# Patient Record
Sex: MALE | Race: WHITE | URBAN - METROPOLITAN AREA
[De-identification: names, ages, dates, MRNs, and addresses within clinical notes are randomized per-mention and may not be internally consistent; named-entity substitution may affect disease eponyms.]

---

## 2017-10-10 ENCOUNTER — IMPORTED ENCOUNTER (OUTPATIENT)
Dept: URBAN - METROPOLITAN AREA CLINIC 50 | Facility: CLINIC | Age: 67
End: 2017-10-10

## 2017-10-16 ENCOUNTER — IMPORTED ENCOUNTER (OUTPATIENT)
Dept: URBAN - METROPOLITAN AREA CLINIC 50 | Facility: CLINIC | Age: 67
End: 2017-10-16

## 2017-10-25 ENCOUNTER — IMPORTED ENCOUNTER (OUTPATIENT)
Dept: URBAN - METROPOLITAN AREA CLINIC 50 | Facility: CLINIC | Age: 67
End: 2017-10-25

## 2017-12-19 ENCOUNTER — IMPORTED ENCOUNTER (OUTPATIENT)
Dept: URBAN - METROPOLITAN AREA CLINIC 50 | Facility: CLINIC | Age: 67
End: 2017-12-19

## 2018-01-02 ENCOUNTER — IMPORTED ENCOUNTER (OUTPATIENT)
Dept: URBAN - METROPOLITAN AREA CLINIC 50 | Facility: CLINIC | Age: 68
End: 2018-01-02

## 2018-01-17 ENCOUNTER — IMPORTED ENCOUNTER (OUTPATIENT)
Dept: URBAN - METROPOLITAN AREA CLINIC 50 | Facility: CLINIC | Age: 68
End: 2018-01-17

## 2018-03-14 ENCOUNTER — IMPORTED ENCOUNTER (OUTPATIENT)
Dept: URBAN - METROPOLITAN AREA CLINIC 50 | Facility: CLINIC | Age: 68
End: 2018-03-14

## 2018-05-01 ENCOUNTER — IMPORTED ENCOUNTER (OUTPATIENT)
Dept: URBAN - METROPOLITAN AREA CLINIC 50 | Facility: CLINIC | Age: 68
End: 2018-05-01

## 2018-05-07 ENCOUNTER — IMPORTED ENCOUNTER (OUTPATIENT)
Dept: URBAN - METROPOLITAN AREA CLINIC 50 | Facility: CLINIC | Age: 68
End: 2018-05-07

## 2018-07-09 ENCOUNTER — IMPORTED ENCOUNTER (OUTPATIENT)
Dept: URBAN - METROPOLITAN AREA CLINIC 50 | Facility: CLINIC | Age: 68
End: 2018-07-09

## 2018-08-07 ENCOUNTER — IMPORTED ENCOUNTER (OUTPATIENT)
Dept: URBAN - METROPOLITAN AREA CLINIC 50 | Facility: CLINIC | Age: 68
End: 2018-08-07

## 2018-08-23 ENCOUNTER — IMPORTED ENCOUNTER (OUTPATIENT)
Dept: URBAN - METROPOLITAN AREA CLINIC 50 | Facility: CLINIC | Age: 68
End: 2018-08-23

## 2018-10-15 ENCOUNTER — IMPORTED ENCOUNTER (OUTPATIENT)
Dept: URBAN - METROPOLITAN AREA CLINIC 50 | Facility: CLINIC | Age: 68
End: 2018-10-15

## 2018-10-30 ENCOUNTER — IMPORTED ENCOUNTER (OUTPATIENT)
Dept: URBAN - METROPOLITAN AREA CLINIC 50 | Facility: CLINIC | Age: 68
End: 2018-10-30

## 2018-11-12 ENCOUNTER — IMPORTED ENCOUNTER (OUTPATIENT)
Dept: URBAN - METROPOLITAN AREA CLINIC 50 | Facility: CLINIC | Age: 68
End: 2018-11-12

## 2019-01-24 ENCOUNTER — IMPORTED ENCOUNTER (OUTPATIENT)
Dept: URBAN - METROPOLITAN AREA CLINIC 50 | Facility: CLINIC | Age: 69
End: 2019-01-24

## 2019-02-04 ENCOUNTER — IMPORTED ENCOUNTER (OUTPATIENT)
Dept: URBAN - METROPOLITAN AREA CLINIC 50 | Facility: CLINIC | Age: 69
End: 2019-02-04

## 2019-03-11 ENCOUNTER — IMPORTED ENCOUNTER (OUTPATIENT)
Dept: URBAN - METROPOLITAN AREA CLINIC 50 | Facility: CLINIC | Age: 69
End: 2019-03-11

## 2019-04-16 ENCOUNTER — IMPORTED ENCOUNTER (OUTPATIENT)
Dept: URBAN - METROPOLITAN AREA CLINIC 50 | Facility: CLINIC | Age: 69
End: 2019-04-16

## 2019-04-23 ENCOUNTER — IMPORTED ENCOUNTER (OUTPATIENT)
Dept: URBAN - METROPOLITAN AREA CLINIC 50 | Facility: CLINIC | Age: 69
End: 2019-04-23

## 2019-04-23 NOTE — PATIENT DISCUSSION
"""SLT right eye performed today with signed consent."" ""Start Prednisolone Acetate right eye twice a day

## 2019-07-01 ENCOUNTER — IMPORTED ENCOUNTER (OUTPATIENT)
Dept: URBAN - METROPOLITAN AREA CLINIC 50 | Facility: CLINIC | Age: 69
End: 2019-07-01

## 2019-07-01 NOTE — PATIENT DISCUSSION
"""S/P SLT OU "" ""Reassured patient that intraocular pressures (IOPs) are at target levels and other ocular findings are stable. Continue present management and/or medication(s).  Follow up as directed. "" ""Continue Dorzolamide both eyes twice a day ."" ""Continue Timolol Maleate 0.5% both eyes twice a day ."" ""Continue Latanoprost both eyes at bedtime ."""

## 2019-08-14 ENCOUNTER — IMPORTED ENCOUNTER (OUTPATIENT)
Dept: URBAN - METROPOLITAN AREA CLINIC 50 | Facility: CLINIC | Age: 69
End: 2019-08-14

## 2019-10-15 ENCOUNTER — IMPORTED ENCOUNTER (OUTPATIENT)
Dept: URBAN - METROPOLITAN AREA CLINIC 50 | Facility: CLINIC | Age: 69
End: 2019-10-15

## 2019-11-11 ENCOUNTER — IMPORTED ENCOUNTER (OUTPATIENT)
Dept: URBAN - METROPOLITAN AREA CLINIC 50 | Facility: CLINIC | Age: 69
End: 2019-11-11

## 2019-12-02 ENCOUNTER — IMPORTED ENCOUNTER (OUTPATIENT)
Dept: URBAN - METROPOLITAN AREA CLINIC 50 | Facility: CLINIC | Age: 69
End: 2019-12-02

## 2019-12-03 ENCOUNTER — IMPORTED ENCOUNTER (OUTPATIENT)
Dept: URBAN - METROPOLITAN AREA CLINIC 50 | Facility: CLINIC | Age: 69
End: 2019-12-03

## 2019-12-04 ENCOUNTER — IMPORTED ENCOUNTER (OUTPATIENT)
Dept: URBAN - METROPOLITAN AREA CLINIC 50 | Facility: CLINIC | Age: 69
End: 2019-12-04

## 2019-12-16 ENCOUNTER — IMPORTED ENCOUNTER (OUTPATIENT)
Dept: URBAN - METROPOLITAN AREA CLINIC 50 | Facility: CLINIC | Age: 69
End: 2019-12-16

## 2020-01-31 ENCOUNTER — IMPORTED ENCOUNTER (OUTPATIENT)
Dept: URBAN - METROPOLITAN AREA CLINIC 50 | Facility: CLINIC | Age: 70
End: 2020-01-31

## 2020-02-05 ENCOUNTER — IMPORTED ENCOUNTER (OUTPATIENT)
Dept: URBAN - METROPOLITAN AREA CLINIC 50 | Facility: CLINIC | Age: 70
End: 2020-02-05

## 2020-02-14 ENCOUNTER — IMPORTED ENCOUNTER (OUTPATIENT)
Dept: URBAN - METROPOLITAN AREA CLINIC 50 | Facility: CLINIC | Age: 70
End: 2020-02-14

## 2020-02-17 ENCOUNTER — IMPORTED ENCOUNTER (OUTPATIENT)
Dept: URBAN - METROPOLITAN AREA CLINIC 50 | Facility: CLINIC | Age: 70
End: 2020-02-17

## 2020-03-09 ENCOUNTER — IMPORTED ENCOUNTER (OUTPATIENT)
Dept: URBAN - METROPOLITAN AREA CLINIC 50 | Facility: CLINIC | Age: 70
End: 2020-03-09

## 2020-03-10 ENCOUNTER — IMPORTED ENCOUNTER (OUTPATIENT)
Dept: URBAN - METROPOLITAN AREA CLINIC 50 | Facility: CLINIC | Age: 70
End: 2020-03-10

## 2020-04-20 ENCOUNTER — IMPORTED ENCOUNTER (OUTPATIENT)
Dept: URBAN - METROPOLITAN AREA CLINIC 50 | Facility: CLINIC | Age: 70
End: 2020-04-20

## 2020-07-27 ENCOUNTER — IMPORTED ENCOUNTER (OUTPATIENT)
Dept: URBAN - METROPOLITAN AREA CLINIC 50 | Facility: CLINIC | Age: 70
End: 2020-07-27

## 2020-10-30 ENCOUNTER — IMPORTED ENCOUNTER (OUTPATIENT)
Dept: URBAN - METROPOLITAN AREA CLINIC 50 | Facility: CLINIC | Age: 70
End: 2020-10-30

## 2020-12-23 ENCOUNTER — IMPORTED ENCOUNTER (OUTPATIENT)
Dept: URBAN - METROPOLITAN AREA CLINIC 50 | Facility: CLINIC | Age: 70
End: 2020-12-23

## 2020-12-30 ENCOUNTER — IMPORTED ENCOUNTER (OUTPATIENT)
Dept: URBAN - METROPOLITAN AREA CLINIC 50 | Facility: CLINIC | Age: 70
End: 2020-12-30

## 2021-01-11 ENCOUNTER — IMPORTED ENCOUNTER (OUTPATIENT)
Dept: URBAN - METROPOLITAN AREA CLINIC 50 | Facility: CLINIC | Age: 71
End: 2021-01-11

## 2021-01-25 ENCOUNTER — IMPORTED ENCOUNTER (OUTPATIENT)
Dept: URBAN - METROPOLITAN AREA CLINIC 50 | Facility: CLINIC | Age: 71
End: 2021-01-25

## 2021-03-22 ENCOUNTER — IMPORTED ENCOUNTER (OUTPATIENT)
Dept: URBAN - METROPOLITAN AREA CLINIC 50 | Facility: CLINIC | Age: 71
End: 2021-03-22

## 2021-03-29 ENCOUNTER — IMPORTED ENCOUNTER (OUTPATIENT)
Dept: URBAN - METROPOLITAN AREA CLINIC 50 | Facility: CLINIC | Age: 71
End: 2021-03-29

## 2021-04-17 ASSESSMENT — PACHYMETRY
OS_CT_UM: 515
OD_CT_UM: 494
OS_CT_UM: 515
OD_CT_UM: 494
OD_CT_UM: 494
OS_CT_UM: 515
OD_CT_UM: 494
OS_CT_UM: 515
OS_CT_UM: 515
OD_CT_UM: 494
OS_CT_UM: 515
OD_CT_UM: 494
OD_CT_UM: 494
OS_CT_UM: 515
OS_CT_UM: 515
OD_CT_UM: 494
OS_CT_UM: 515
OD_CT_UM: 494
OD_CT_UM: 494
OS_CT_UM: 515
OS_CT_UM: 515
OD_CT_UM: 494
OS_CT_UM: 515
OD_CT_UM: 494
OD_CT_UM: 494
OS_CT_UM: 515
OD_CT_UM: 494
OS_CT_UM: 515
OD_CT_UM: 494
OD_CT_UM: 494
OS_CT_UM: 515
OS_CT_UM: 515
OD_CT_UM: 494
OS_CT_UM: 515
OD_CT_UM: 494
OS_CT_UM: 515
OD_CT_UM: 494
OS_CT_UM: 515
OD_CT_UM: 494
OS_CT_UM: 515
OD_CT_UM: 494
OS_CT_UM: 515
OD_CT_UM: 494
OD_CT_UM: 494
OS_CT_UM: 515

## 2021-04-17 ASSESSMENT — VISUAL ACUITY
OS_SC: 20/40
OS_SC: 20/30
OS_PH: 20/40
OS_CC: 20/50
OS_SC: 20/40
OS_SC: 20/30
OS_CC: 20/50
OD_SC: CF@2FT
OS_CC: 20/40
OS_PH: @ 14 IN
OS_SC: 20/30
OS_SC: 20/30+1
OS_CC: 20/40
OS_BAT: 20/50
OS_SC: 20/30-
OD_SC: CF@2FT
OS_CC: 20/40
OS_SC: 20/40
OS_SC: 20/30
OD_SC: CF @ 3 FT
OS_PH: 20/50
OD_CC: 20/400 ECC
OS_CC: 20/30-2
OS_CC: 20/25
OS_PH: 20/25+
OS_CC: 20/40-1
OS_SC: 20/30-1
OS_CC: 20/40
OS_CC: 20/40
OS_SC: 20/40-1
OS_OTHER: 20/50. 20/70.
OS_SC: 20/60+1

## 2021-04-17 ASSESSMENT — TONOMETRY
OS_IOP_MMHG: 14
OD_IOP_MMHG: 12
OD_IOP_MMHG: 16
OD_IOP_MMHG: 15
OD_IOP_MMHG: 17
OS_IOP_MMHG: 15
OS_IOP_MMHG: 12
OS_IOP_MMHG: 10
OS_IOP_MMHG: 12
OD_IOP_MMHG: 14
OS_IOP_MMHG: 12
OD_IOP_MMHG: 13
OS_IOP_MMHG: 12
OS_IOP_MMHG: 17
OD_IOP_MMHG: 12
OD_IOP_MMHG: 15
OS_IOP_MMHG: 12
OD_IOP_MMHG: 11
OD_IOP_MMHG: 11
OS_IOP_MMHG: 13
OD_IOP_MMHG: 10
OS_IOP_MMHG: 8
OD_IOP_MMHG: 10
OS_IOP_MMHG: 10
OS_IOP_MMHG: 10
OD_IOP_MMHG: 10
OD_IOP_MMHG: 12
OD_IOP_MMHG: 15
OS_IOP_MMHG: 15
OS_IOP_MMHG: 13
OD_IOP_MMHG: 13
OS_IOP_MMHG: 15
OD_IOP_MMHG: 12
OS_IOP_MMHG: 13
OD_IOP_MMHG: 9
OD_IOP_MMHG: 14
OS_IOP_MMHG: 13
OS_IOP_MMHG: 14
OS_IOP_MMHG: 15
OS_IOP_MMHG: 14
OD_IOP_MMHG: 13
OD_IOP_MMHG: 18
OD_IOP_MMHG: 12
OS_IOP_MMHG: 10
OS_IOP_MMHG: 08
OD_IOP_MMHG: 13
OS_IOP_MMHG: 12
OS_IOP_MMHG: 13
OD_IOP_MMHG: 11
OS_IOP_MMHG: 11
OD_IOP_MMHG: 12
OS_IOP_MMHG: 10
OD_IOP_MMHG: 14
OS_IOP_MMHG: 13
OD_IOP_MMHG: 14
OS_IOP_MMHG: 10
OD_IOP_MMHG: 16
OS_IOP_MMHG: 12
OD_IOP_MMHG: 11
OD_IOP_MMHG: 11
OD_IOP_MMHG: 09
OS_IOP_MMHG: 08
OD_IOP_MMHG: 15
OS_IOP_MMHG: 11
OS_IOP_MMHG: 10
OS_IOP_MMHG: 15
OS_IOP_MMHG: 13
OD_IOP_MMHG: 14
OD_IOP_MMHG: 16
OD_IOP_MMHG: 12

## 2021-06-02 NOTE — PATIENT DISCUSSION
"""SLT left eye performed today with patient's signed consent. ""
"""SLT right eye scheduled 4/23/19. ""
Satisfactory

## 2021-06-19 ENCOUNTER — PREPPED CHART (OUTPATIENT)
Dept: URBAN - METROPOLITAN AREA CLINIC 50 | Facility: CLINIC | Age: 71
End: 2021-06-19

## 2021-06-21 ENCOUNTER — 3 MONTH FOLLOW-UP (OUTPATIENT)
Dept: URBAN - METROPOLITAN AREA CLINIC 50 | Facility: CLINIC | Age: 71
End: 2021-06-21

## 2021-06-21 DIAGNOSIS — H40.1112: ICD-10-CM

## 2021-06-21 DIAGNOSIS — H40.1123: ICD-10-CM

## 2021-06-21 PROCEDURE — 92012 INTRM OPH EXAM EST PATIENT: CPT

## 2021-06-21 ASSESSMENT — VISUAL ACUITY
OD_SC: CF 3FT
OS_SC: 20/30

## 2021-06-21 ASSESSMENT — TONOMETRY
OD_IOP_MMHG: 12
OS_IOP_MMHG: 11

## 2021-06-21 NOTE — PATIENT DISCUSSION
Moderate Primary Open Angle Glaucoma, right eye (S/P SLT OD 3/22/2021). IOP stable with current drop therapy. Will continue to monitor.

## 2021-06-21 NOTE — PATIENT DISCUSSION
Severe Primary Open Angle Glaucoma, left eye (S/P SLT OS 4/2019, S/P SLT OS 3/29/2021). IOP stable with current drop therapy. Will continue to monitor.

## 2021-10-18 ENCOUNTER — 4 MONTH FOLLOW-UP (OUTPATIENT)
Dept: URBAN - METROPOLITAN AREA CLINIC 50 | Facility: CLINIC | Age: 71
End: 2021-10-18

## 2021-10-18 DIAGNOSIS — H40.1123: ICD-10-CM

## 2021-10-18 DIAGNOSIS — H40.1112: ICD-10-CM

## 2021-10-18 PROCEDURE — 92012 INTRM OPH EXAM EST PATIENT: CPT

## 2021-10-18 ASSESSMENT — TONOMETRY
OS_IOP_MMHG: 13
OS_IOP_MMHG: 11
OD_IOP_MMHG: 12
OD_IOP_MMHG: 15

## 2021-10-18 ASSESSMENT — VISUAL ACUITY
OS_SC: 20/30-2
OD_SC: CF 5FT
OU_SC: 20/30-2

## 2022-03-10 ENCOUNTER — DIAGNOSTICS ONLY (OUTPATIENT)
Dept: URBAN - METROPOLITAN AREA CLINIC 48 | Facility: CLINIC | Age: 72
End: 2022-03-10

## 2022-03-10 ENCOUNTER — EMERGENCY VISIT (OUTPATIENT)
Dept: URBAN - METROPOLITAN AREA CLINIC 48 | Facility: CLINIC | Age: 72
End: 2022-03-10

## 2022-03-10 DIAGNOSIS — H35.372: ICD-10-CM

## 2022-03-10 DIAGNOSIS — H40.1112: ICD-10-CM

## 2022-03-10 DIAGNOSIS — H40.1123: ICD-10-CM

## 2022-03-10 DIAGNOSIS — H27.111: ICD-10-CM

## 2022-03-10 PROCEDURE — 92134 CPTRZ OPH DX IMG PST SGM RTA: CPT

## 2022-03-10 PROCEDURE — 92014 COMPRE OPH EXAM EST PT 1/>: CPT

## 2022-03-10 PROCEDURE — 92083 EXTENDED VISUAL FIELD XM: CPT

## 2022-03-10 PROCEDURE — 92133 CPTRZD OPH DX IMG PST SGM ON: CPT

## 2022-03-10 ASSESSMENT — TONOMETRY
OS_IOP_MMHG: 11
OD_IOP_MMHG: 12

## 2022-03-10 ASSESSMENT — VISUAL ACUITY
OD_CC: CF 6FT
OU_CC: 20/40+2
OS_CC: 20/40+2

## 2022-04-04 ENCOUNTER — ESTABLISHED PATIENT (OUTPATIENT)
Dept: URBAN - METROPOLITAN AREA CLINIC 50 | Facility: CLINIC | Age: 72
End: 2022-04-04

## 2022-04-04 DIAGNOSIS — H40.1123: ICD-10-CM

## 2022-04-04 DIAGNOSIS — H40.1112: ICD-10-CM

## 2022-04-04 DIAGNOSIS — H33.003: ICD-10-CM

## 2022-04-04 DIAGNOSIS — H27.111: ICD-10-CM

## 2022-04-04 DIAGNOSIS — H35.372: ICD-10-CM

## 2022-04-04 PROCEDURE — 92134 CPTRZ OPH DX IMG PST SGM RTA: CPT

## 2022-04-04 PROCEDURE — 92014 COMPRE OPH EXAM EST PT 1/>: CPT

## 2022-04-04 ASSESSMENT — TONOMETRY
OD_IOP_MMHG: 14
OS_IOP_MMHG: 13
OS_IOP_MMHG: 11
OD_IOP_MMHG: 11

## 2022-04-04 ASSESSMENT — VISUAL ACUITY
OS_CC: 20/30-1
OD_CC: CF 3FT
OS_CC: J2

## 2022-04-04 NOTE — PATIENT DISCUSSION
D/w patient the options of suturing lens back in or removing. Patient would like to proceed with suturing lens back in. Patient follows up with Dr. Jessica Erickson on Monday. Advised patient to keep appointment with Dr. Jessica Erickson before proceeding with surgery.

## 2022-06-28 NOTE — PATIENT DISCUSSION
likely due to CLRx intolerance. Patient reports re-using CLRx for days but denies sleeping or showering in lenses. d/c CLRx until redness resolves, at least 1 week. Begin FML QID OU until sample runs out. Recommended different modality/material within daily lens category. IF daily lenses not an option, recommended ClearCare cleaning solution. Stressed importance of good hygiene with and without CLRx.

## 2022-12-05 ENCOUNTER — FOLLOW UP (OUTPATIENT)
Dept: URBAN - METROPOLITAN AREA CLINIC 50 | Facility: CLINIC | Age: 72
End: 2022-12-05

## 2022-12-05 PROCEDURE — 92012 INTRM OPH EXAM EST PATIENT: CPT

## 2022-12-05 ASSESSMENT — VISUAL ACUITY
OD_CC: CF 4FT
OS_CC: J2
OU_CC: 20/30
OS_CC: 20/30
OS_PH: 20/25-2

## 2022-12-05 ASSESSMENT — TONOMETRY
OS_IOP_MMHG: 13
OD_IOP_MMHG: 13
OD_IOP_MMHG: 10
OS_IOP_MMHG: 11

## 2022-12-05 NOTE — PATIENT DISCUSSION
IOP today 13 OU. Patient is to continue current drop therapy.  Will see patient back in 6 months for HVF 24-2 Fast/OCT RNFL/Comprehensive Exam.

## 2022-12-05 NOTE — PATIENT DISCUSSION
Dislocated IOL inferiorly. Patient saw Dr. Chanda Kearns at [facility] and he recommended to leave the IOL as it is since he already has such profound vision loss. Returned call to Rosa and informed her of below recommendations, verbalized understanding. Will fax over med administration form.    Writer called mother at new number and informed her of below recommendations as well, verbalized understanding.  Rx e-scribed for 2 mg tablets.  Also scheduled patient follow-up appointment on 4/10/17.  Forwarded to Dr. Meli Butt as MAYA

## 2023-08-21 ENCOUNTER — COMPREHENSIVE EXAM (OUTPATIENT)
Dept: URBAN - METROPOLITAN AREA CLINIC 50 | Facility: LOCATION | Age: 73
End: 2023-08-21

## 2023-08-21 DIAGNOSIS — H40.1112: ICD-10-CM

## 2023-08-21 DIAGNOSIS — H40.1123: ICD-10-CM

## 2023-08-21 DIAGNOSIS — H16.223: ICD-10-CM

## 2023-08-21 DIAGNOSIS — H35.372: ICD-10-CM

## 2023-08-21 PROCEDURE — 92015 DETERMINE REFRACTIVE STATE: CPT

## 2023-08-21 PROCEDURE — 92133 CPTRZD OPH DX IMG PST SGM ON: CPT

## 2023-08-21 PROCEDURE — 92083 EXTENDED VISUAL FIELD XM: CPT

## 2023-08-21 PROCEDURE — 92014 COMPRE OPH EXAM EST PT 1/>: CPT

## 2023-08-21 ASSESSMENT — TONOMETRY
OD_IOP_MMHG: 16
OS_IOP_MMHG: 13
OS_IOP_MMHG: 15
OD_IOP_MMHG: 13

## 2023-08-21 ASSESSMENT — VISUAL ACUITY
OS_SC: 20/70
OU_SC: 20/50
OD_PH: 20/40

## 2023-08-21 ASSESSMENT — KERATOMETRY
OD_K1POWER_DIOPTERS: 41.75
OS_AXISANGLE_DEGREES: 150
OD_AXISANGLE2_DEGREES: 90
OS_AXISANGLE2_DEGREES: 60
OS_K1POWER_DIOPTERS: 41.25
OD_AXISANGLE_DEGREES: 0
OS_K2POWER_DIOPTERS: 42.25
OD_K2POWER_DIOPTERS: 41.75

## 2023-10-30 ENCOUNTER — FOLLOW UP (OUTPATIENT)
Dept: URBAN - METROPOLITAN AREA CLINIC 50 | Facility: LOCATION | Age: 73
End: 2023-10-30

## 2023-10-30 DIAGNOSIS — H40.1123: ICD-10-CM

## 2023-10-30 DIAGNOSIS — H40.1112: ICD-10-CM

## 2023-10-30 PROCEDURE — 92012 INTRM OPH EXAM EST PATIENT: CPT

## 2023-10-30 ASSESSMENT — KERATOMETRY
OS_AXISANGLE_DEGREES: 150
OS_K1POWER_DIOPTERS: 41.25
OS_K2POWER_DIOPTERS: 42.25
OS_AXISANGLE2_DEGREES: 60
OD_AXISANGLE2_DEGREES: 90
OD_K2POWER_DIOPTERS: 41.75
OD_AXISANGLE_DEGREES: 0
OD_K1POWER_DIOPTERS: 41.75

## 2023-10-30 ASSESSMENT — TONOMETRY
OS_IOP_MMHG: 12
OD_IOP_MMHG: 13
OD_IOP_MMHG: 10
OS_IOP_MMHG: 10

## 2023-10-30 ASSESSMENT — VISUAL ACUITY
OS_SC: 20/50
OD_SC: CF 1FT
OD_PH: 20/40

## 2024-06-03 ENCOUNTER — FOLLOW UP (OUTPATIENT)
Dept: URBAN - METROPOLITAN AREA CLINIC 50 | Facility: LOCATION | Age: 74
End: 2024-06-03

## 2024-06-03 DIAGNOSIS — H40.1123: ICD-10-CM

## 2024-06-03 DIAGNOSIS — H27.01: ICD-10-CM

## 2024-06-03 DIAGNOSIS — H40.1112: ICD-10-CM

## 2024-06-03 DIAGNOSIS — H16.223: ICD-10-CM

## 2024-06-03 PROCEDURE — 99213 OFFICE O/P EST LOW 20 MIN: CPT

## 2024-06-03 ASSESSMENT — VISUAL ACUITY
OS_SC: 20/50
OD_SC: CF 1FT

## 2024-06-03 ASSESSMENT — KERATOMETRY
OD_K2POWER_DIOPTERS: 41.75
OD_K1POWER_DIOPTERS: 41.75
OD_AXISANGLE_DEGREES: 0
OS_K1POWER_DIOPTERS: 41.25
OS_AXISANGLE_DEGREES: 150
OS_K2POWER_DIOPTERS: 42.25
OS_AXISANGLE2_DEGREES: 60
OD_AXISANGLE2_DEGREES: 90

## 2024-06-03 ASSESSMENT — TONOMETRY
OS_IOP_MMHG: 08
OD_IOP_MMHG: 13
OS_IOP_MMHG: 10
OD_IOP_MMHG: 10

## 2024-11-21 ENCOUNTER — DIAGNOSTICS ONLY (OUTPATIENT)
Dept: URBAN - METROPOLITAN AREA CLINIC 50 | Facility: LOCATION | Age: 74
End: 2024-11-21

## 2024-11-21 DIAGNOSIS — H40.1112: ICD-10-CM

## 2024-11-21 PROCEDURE — 92083 EXTENDED VISUAL FIELD XM: CPT

## 2024-11-21 PROCEDURE — 92133 CPTRZD OPH DX IMG PST SGM ON: CPT

## 2025-03-31 ENCOUNTER — COMPREHENSIVE EXAM (OUTPATIENT)
Age: 75
End: 2025-03-31

## 2025-03-31 DIAGNOSIS — H33.003: ICD-10-CM

## 2025-03-31 DIAGNOSIS — Z98.890: ICD-10-CM

## 2025-03-31 DIAGNOSIS — H40.1112: ICD-10-CM

## 2025-03-31 DIAGNOSIS — H35.372: ICD-10-CM

## 2025-03-31 DIAGNOSIS — H40.1123: ICD-10-CM

## 2025-03-31 DIAGNOSIS — H16.223: ICD-10-CM

## 2025-03-31 DIAGNOSIS — H27.01: ICD-10-CM

## 2025-03-31 PROCEDURE — 99214 OFFICE O/P EST MOD 30 MIN: CPT

## 2025-06-09 ENCOUNTER — FOLLOW UP (OUTPATIENT)
Age: 75
End: 2025-06-09

## 2025-06-09 DIAGNOSIS — H40.1112: ICD-10-CM

## 2025-06-09 DIAGNOSIS — H52.4: ICD-10-CM

## 2025-06-09 DIAGNOSIS — H40.1123: ICD-10-CM

## 2025-06-09 PROCEDURE — 92015 DETERMINE REFRACTIVE STATE: CPT

## 2025-06-09 PROCEDURE — 99213 OFFICE O/P EST LOW 20 MIN: CPT
